# Patient Record
Sex: MALE | Employment: STUDENT | URBAN - METROPOLITAN AREA
[De-identification: names, ages, dates, MRNs, and addresses within clinical notes are randomized per-mention and may not be internally consistent; named-entity substitution may affect disease eponyms.]

---
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
## 2018-05-13 ENCOUNTER — HOSPITAL ENCOUNTER (OUTPATIENT)
Age: 18
Discharge: HOME | End: 2018-05-13
Attending: FAMILY MEDICINE
Payer: COMMERCIAL

## 2018-05-13 VITALS
BODY MASS INDEX: 20 KG/M2 | RESPIRATION RATE: 16 BRPM | OXYGEN SATURATION: 100 % | TEMPERATURE: 99 F | HEART RATE: 100 BPM | DIASTOLIC BLOOD PRESSURE: 78 MMHG | WEIGHT: 145 LBS | SYSTOLIC BLOOD PRESSURE: 129 MMHG

## 2018-05-13 DIAGNOSIS — J01.10 ACUTE NON-RECURRENT FRONTAL SINUSITIS: Primary | ICD-10-CM

## 2018-05-13 PROCEDURE — 99214 OFFICE O/P EST MOD 30 MIN: CPT

## 2018-05-13 PROCEDURE — 99213 OFFICE O/P EST LOW 20 MIN: CPT

## 2018-05-13 RX ORDER — AMOXICILLIN AND CLAVULANATE POTASSIUM 875; 125 MG/1; MG/1
1 TABLET, FILM COATED ORAL 2 TIMES DAILY
Qty: 14 TABLET | Refills: 0 | Status: SHIPPED | OUTPATIENT
Start: 2018-05-13 | End: 2018-05-20

## 2018-05-13 NOTE — ED INITIAL ASSESSMENT (HPI)
Nasal congestin and frontal sinus pressure for 1 week. Has had a moderate amount of yellow mucus. Denies fever and chills. Denies ear pain. Is coughing. Has post nasal drip.

## 2018-05-13 NOTE — ED PROVIDER NOTES
Patient Seen in: [address]    History   Patient presents with:  Headache (neurologic)    Stated Complaint: sinuses headache and low grade fever    HPI    21year old male patient presents with nasal congestion, sinus pres stated in HPI.     Physical Exam   ED Triage Vitals [05/13/18 1211]  BP: 129/78  Pulse: 100  Resp: 16  Temp: 98.5 °F (36.9 °C)  Temp src: Oral  SpO2: 100 %  O2 Device: None (Room air)    Current:/78   Pulse 100   Temp 98.5 °F (36.9 °C) (Oral)   Resp 1

## 2019-05-13 ENCOUNTER — HOSPITAL ENCOUNTER (OUTPATIENT)
Age: 19
Discharge: HOME | End: 2019-05-13
Attending: FAMILY MEDICINE
Payer: COMMERCIAL

## 2019-05-13 ENCOUNTER — APPOINTMENT (OUTPATIENT)
Dept: GENERAL RADIOLOGY | Age: 19
End: 2019-05-13
Attending: FAMILY MEDICINE
Payer: COMMERCIAL

## 2019-05-13 VITALS
RESPIRATION RATE: 18 BRPM | TEMPERATURE: 98 F | HEART RATE: 92 BPM | DIASTOLIC BLOOD PRESSURE: 72 MMHG | OXYGEN SATURATION: 100 % | SYSTOLIC BLOOD PRESSURE: 140 MMHG

## 2019-05-13 DIAGNOSIS — S93.401A SPRAIN OF RIGHT ANKLE, UNSPECIFIED LIGAMENT, INITIAL ENCOUNTER: Primary | ICD-10-CM

## 2019-05-13 PROCEDURE — 73610 X-RAY EXAM OF ANKLE: CPT | Performed by: FAMILY MEDICINE

## 2019-05-13 PROCEDURE — 99213 OFFICE O/P EST LOW 20 MIN: CPT

## 2019-05-13 RX ORDER — NAPROXEN 500 MG/1
500 TABLET ORAL 2 TIMES DAILY WITH MEALS
Qty: 14 TABLET | Refills: 0 | Status: SHIPPED | OUTPATIENT
Start: 2019-05-13 | End: 2019-05-20

## 2019-05-13 NOTE — ED INITIAL ASSESSMENT (HPI)
Pt presents to the IC with c/o right ankle pain s/p rolling it while playing basketball yesterday. Pt has been icing it since, applied an ankle wrap, but has not taken anything for the pain. No numbness/tingling.

## 2019-05-13 NOTE — ED PROVIDER NOTES
Patient presents with:  Lower Extremity Injury (musculoskeletal)    HPI:   Mayo Cleary is a 19year old male present with complain of right ankle injury. Time of injury: Yesterday while playing basketball.   Location: Medial ankle  Nature/type of injury: T Sprain    PLAN:   PROCEDURE:  XR ANKLE (MIN 3 VIEWS), RIGHT (CPT=73610)     COMPARISON: None. INDICATIONS:  Trauma. Twisted right ankle playing basketball yesterday. Pain over the medial aspect of the right ankle.      TECHNIQUE:    3 views were obtai

## 2019-07-01 ENCOUNTER — HOSPITAL ENCOUNTER (OUTPATIENT)
Age: 19
Discharge: HOME | End: 2019-07-01
Attending: EMERGENCY MEDICINE
Payer: COMMERCIAL

## 2019-07-01 VITALS
SYSTOLIC BLOOD PRESSURE: 128 MMHG | OXYGEN SATURATION: 100 % | WEIGHT: 151 LBS | TEMPERATURE: 98 F | HEART RATE: 83 BPM | HEIGHT: 71 IN | DIASTOLIC BLOOD PRESSURE: 77 MMHG | RESPIRATION RATE: 17 BRPM | BODY MASS INDEX: 21.14 KG/M2

## 2019-07-01 DIAGNOSIS — S39.012A STRAIN OF LUMBAR REGION, INITIAL ENCOUNTER: Primary | ICD-10-CM

## 2019-07-01 PROCEDURE — 99214 OFFICE O/P EST MOD 30 MIN: CPT

## 2019-07-01 PROCEDURE — 99213 OFFICE O/P EST LOW 20 MIN: CPT

## 2019-07-01 RX ORDER — TRAMADOL HYDROCHLORIDE 50 MG/1
50 TABLET ORAL EVERY 8 HOURS PRN
Qty: 10 TABLET | Refills: 0 | Status: SHIPPED | OUTPATIENT
Start: 2019-07-01 | End: 2019-07-08

## 2019-07-01 NOTE — ED PROVIDER NOTES
Patient Seen in: [address]    History   Patient presents with:  Back Pain (musculoskeletal)    Stated Complaint: TL-Back Pain    HPI    Patient is a healthy 78-year-old male who has had frequent bouts of low back pain d weakness and numbness. Positive for stated complaint: TL-Back Pain  Other systems are as noted in HPI. Constitutional and vital signs reviewed. All other systems reviewed and negative except as noted above.     Physical Exam     ED Triage Vitals

## 2019-08-21 ENCOUNTER — TELEPHONE (OUTPATIENT)
Dept: PEDIATRICS CLINIC | Facility: CLINIC | Age: 19
End: 2019-08-21

## 2019-08-21 NOTE — TELEPHONE ENCOUNTER
Noted. Patient contacted. Notified that [facility] office is not open today. Patient is okay to  immunization record tomorrow. Message routed accordingly to clinical staff for print out.    Please call patient when ready for

## 2019-08-22 ENCOUNTER — OFFICE VISIT (OUTPATIENT)
Dept: FAMILY MEDICINE CLINIC | Facility: CLINIC | Age: 19
End: 2019-08-22

## 2019-08-22 VITALS
TEMPERATURE: 98 F | OXYGEN SATURATION: 98 % | SYSTOLIC BLOOD PRESSURE: 118 MMHG | HEIGHT: 70 IN | WEIGHT: 150 LBS | BODY MASS INDEX: 21.47 KG/M2 | DIASTOLIC BLOOD PRESSURE: 64 MMHG | HEART RATE: 74 BPM

## 2019-08-22 DIAGNOSIS — Z02.0 SCHOOL PHYSICAL EXAM: Primary | ICD-10-CM

## 2019-08-22 PROCEDURE — 99385 PREV VISIT NEW AGE 18-39: CPT | Performed by: PHYSICIAN ASSISTANT

## 2019-08-22 NOTE — TELEPHONE ENCOUNTER
Pt arrived at [facility] for vaccine record. Vaccine record printed and handed to patient.

## 2019-08-22 NOTE — PROGRESS NOTES
CHIEF COMPLAINT:     Patient presents with:  School Physical      HPI:   Jackie Hay is a 19year old male who presents for physical exam for a study abroad program-- will be going to [employer] in 2020.      Patient has concerns of nothing at this ti °C) (Oral)   Ht 70\"   Wt 150 lb   SpO2 98%   BMI 21.52 kg/m²  Body mass index is 21.52 kg/m².      GENERAL: well developed, well nourished,in no apparent distress  SKIN: no rashes,no suspicious lesions  HEENT: atraumatic, normocephalic,ears and throat are

## 2020-07-14 ENCOUNTER — OFFICE VISIT (OUTPATIENT)
Dept: INTERNAL MEDICINE CLINIC | Facility: CLINIC | Age: 20
End: 2020-07-14

## 2020-07-14 VITALS
DIASTOLIC BLOOD PRESSURE: 69 MMHG | BODY MASS INDEX: 22.9 KG/M2 | HEART RATE: 81 BPM | HEIGHT: 70 IN | WEIGHT: 160 LBS | TEMPERATURE: 98 F | SYSTOLIC BLOOD PRESSURE: 126 MMHG

## 2020-07-14 DIAGNOSIS — Z00.00 PHYSICAL EXAM, ANNUAL: Primary | ICD-10-CM

## 2020-07-14 DIAGNOSIS — M54.50 CHRONIC MIDLINE LOW BACK PAIN WITHOUT SCIATICA: ICD-10-CM

## 2020-07-14 DIAGNOSIS — G89.29 CHRONIC MIDLINE LOW BACK PAIN WITHOUT SCIATICA: ICD-10-CM

## 2020-07-14 PROCEDURE — 99385 PREV VISIT NEW AGE 18-39: CPT | Performed by: INTERNAL MEDICINE

## 2020-07-14 NOTE — PROGRESS NOTES
Fadi Orozco is a 22year old male who presents for a complete physical exam.   HPI:   Pt complains of  Nothing.       Immunization History  Administered            Date(s) Administered    DTAP                  12/01/1998 02/18/1999 04/08/1999 06/20/2016     Lab Results   Component Value Date    ALT 25 07/20/2016    ALT 21 06/20/2016     No results found for: PSA     No current outpatient medications on file.       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     The patient is asked to return for CPX in 1 year

## 2020-07-23 ENCOUNTER — HOSPITAL ENCOUNTER (OUTPATIENT)
Age: 20
Discharge: HOME | End: 2020-07-23
Payer: COMMERCIAL

## 2020-07-23 VITALS
RESPIRATION RATE: 16 BRPM | SYSTOLIC BLOOD PRESSURE: 129 MMHG | WEIGHT: 160 LBS | HEIGHT: 71 IN | TEMPERATURE: 97 F | BODY MASS INDEX: 22.4 KG/M2 | HEART RATE: 73 BPM | DIASTOLIC BLOOD PRESSURE: 81 MMHG | OXYGEN SATURATION: 100 %

## 2020-07-23 DIAGNOSIS — Z20.822 ENCOUNTER FOR LABORATORY TESTING FOR COVID-19 VIRUS: ICD-10-CM

## 2020-07-23 DIAGNOSIS — Z20.822 CLOSE EXPOSURE TO COVID-19 VIRUS: Primary | ICD-10-CM

## 2020-07-23 PROCEDURE — 99202 OFFICE O/P NEW SF 15 MIN: CPT | Performed by: NURSE PRACTITIONER

## 2020-07-23 NOTE — ED PROVIDER NOTES
Patient presents with: Other      HPI:     Wolf Hadley is a 22year old male who presents for evaluation and management of a chief complaint of an exposure to COVID and requesting testing.  He states he was in the same room as someone who tested positive a current or ex partner: Not on file        Emotionally abused: Not on file        Physically abused: Not on file        Forced sexual activity: Not on file    Other Topics      Concerns:         Service: Not Asked        Blood Transfusions: Not Aske for this or any previous visit (from the past 10 hour(s)). MDM:   The COVID test is pending.  We discussed follow up as needed with his PMD.    Diagnosis:    ICD-10-CM    1. Close exposure to COVID-19 virus Z20.828 SARS-COV-2 BY PCR ()     SARS-COV-

## 2020-07-24 LAB — SARS-COV-2 RNA RESP QL NAA+PROBE: NOT DETECTED

## 2020-07-29 ENCOUNTER — OFFICE VISIT (OUTPATIENT)
Dept: PHYSICAL THERAPY | Age: 20
End: 2020-07-29
Attending: PHYSICIAN ASSISTANT
Payer: COMMERCIAL

## 2020-07-29 DIAGNOSIS — M54.50 CHRONIC MIDLINE LOW BACK PAIN WITHOUT SCIATICA: ICD-10-CM

## 2020-07-29 DIAGNOSIS — G89.29 CHRONIC MIDLINE LOW BACK PAIN WITHOUT SCIATICA: ICD-10-CM

## 2020-07-29 PROCEDURE — 97110 THERAPEUTIC EXERCISES: CPT | Performed by: PHYSICAL THERAPIST

## 2020-07-29 PROCEDURE — 97162 PT EVAL MOD COMPLEX 30 MIN: CPT | Performed by: PHYSICAL THERAPIST

## 2020-07-29 NOTE — PROGRESS NOTES
LUMBAR SPINE EVALUATION:   Referring Physician: Dr. Lavon Garcia  Diagnosis: Chronic midline low back pain without sciatica (M54.5,G89.29)   Date of Service: 7/29/2020   Date of Onset: 7/7/2020    PATIENT SUMMARY   Mitzi Villafana is a 22year old y/o male who ache/tightness relieved upon pronelying and back extension improved with repeated extension in lying. He was explained, discussed, and instructed on his HEP and posture correction that is to be done in the next 2 days.   He will be then re-assessed at that morning, and stay still without producing or increasing symptoms in the low back) activities without discomfort. 4. Patient to consistently have good posture to promote her symptomfree condition.      Patient was advised of these findings, precautions, and

## 2020-07-29 NOTE — PATIENT INSTRUCTIONS
Patient was instructed in and issued a HEP for:  Pronelying 2-3 mins to Extension in pronelying x 2-3 mins to Repeated back extension in lying x 10 reps or Repeated extension in standing x 10 reps every 2-3 hours;  Pronelying to stand transfer;  Sitt

## 2020-07-31 ENCOUNTER — OFFICE VISIT (OUTPATIENT)
Dept: PHYSICAL THERAPY | Age: 20
End: 2020-07-31
Attending: PHYSICIAN ASSISTANT
Payer: COMMERCIAL

## 2020-07-31 DIAGNOSIS — G89.29 CHRONIC MIDLINE LOW BACK PAIN WITHOUT SCIATICA: ICD-10-CM

## 2020-07-31 DIAGNOSIS — M54.50 CHRONIC MIDLINE LOW BACK PAIN WITHOUT SCIATICA: ICD-10-CM

## 2020-07-31 PROCEDURE — 97110 THERAPEUTIC EXERCISES: CPT | Performed by: PHYSICAL THERAPIST

## 2020-07-31 NOTE — PROGRESS NOTES
Date: 7/31/2020     Dx: Chronic midline low back pain without sciatica (M54.5,G89.29)           Authorized # of Visits:  8       Referring MD: Adri Delarosa  Next MD visit: none scheduled    Medication Changes since last visit?: No    Subjective: Ilene Burdick report that easier ADLs, functional, work, and recreational activities.    3. Patient to have WNL of lumbar mobility in all directions to facilitate a return to all (sit for any period of time, rise up from sitting, lay down supine (on his back), drive, wake up in the

## 2020-08-05 ENCOUNTER — OFFICE VISIT (OUTPATIENT)
Dept: PHYSICAL THERAPY | Age: 20
End: 2020-08-05
Attending: PHYSICIAN ASSISTANT
Payer: COMMERCIAL

## 2020-08-05 DIAGNOSIS — G89.29 CHRONIC MIDLINE LOW BACK PAIN WITHOUT SCIATICA: ICD-10-CM

## 2020-08-05 DIAGNOSIS — M54.50 CHRONIC MIDLINE LOW BACK PAIN WITHOUT SCIATICA: ICD-10-CM

## 2020-08-05 PROCEDURE — 97110 THERAPEUTIC EXERCISES: CPT | Performed by: PHYSICAL THERAPIST

## 2020-08-05 NOTE — PROGRESS NOTES
Date: 7/31/2020     Dx: Chronic midline low back pain without sciatica (M54.5,G89.29)           Authorized # of Visits:  8       Referring MD: Israel Milan  Next MD visit: none scheduled    Medication Changes since last visit?: No    Subjective: Julia Olivia report that postural stability exercises. He required cueing to correct form and alignment during squat and back exercises. No symptoms produced during his exercises but he was tired after his session. He maintained nil loss extension.      Goals:   Goals     • Ther

## 2020-08-07 ENCOUNTER — OFFICE VISIT (OUTPATIENT)
Dept: PHYSICAL THERAPY | Age: 20
End: 2020-08-07
Attending: PHYSICIAN ASSISTANT
Payer: COMMERCIAL

## 2020-08-07 DIAGNOSIS — M54.50 CHRONIC MIDLINE LOW BACK PAIN WITHOUT SCIATICA: ICD-10-CM

## 2020-08-07 DIAGNOSIS — G89.29 CHRONIC MIDLINE LOW BACK PAIN WITHOUT SCIATICA: ICD-10-CM

## 2020-08-07 PROCEDURE — 97110 THERAPEUTIC EXERCISES: CPT | Performed by: PHYSICAL THERAPIST

## 2020-08-07 NOTE — PROGRESS NOTES
Date: 8/7/2020     Dx: Chronic midline low back pain without sciatica (M54.5,G89.29)           Authorized # of Visits:  8       Referring MD: Anup Heaton  Next MD visit: none scheduled    Medication Changes since last visit?: No    Subjective: Bella Garcia report that tired    LALA over rail x 10 reps Scifit UE only L8 x 5 mins ea fwd/bwkd; postural training    Pronelying x 1 min; NE unload back    REIL sag 8+2 reps; NE    RFIL x 10 reps; NE    RFISitt knees @ 90 degs; P, NW mid back stretch    LALA over rail x 10 reps; Charges:  TherEx x 4       Total Timed Treatment: 53 mins Total Treatment Time: 54 mins

## 2020-08-11 ENCOUNTER — OFFICE VISIT (OUTPATIENT)
Dept: PHYSICAL THERAPY | Age: 20
End: 2020-08-11
Attending: PHYSICIAN ASSISTANT
Payer: COMMERCIAL

## 2020-08-11 DIAGNOSIS — G89.29 CHRONIC MIDLINE LOW BACK PAIN WITHOUT SCIATICA: ICD-10-CM

## 2020-08-11 DIAGNOSIS — M54.50 CHRONIC MIDLINE LOW BACK PAIN WITHOUT SCIATICA: ICD-10-CM

## 2020-08-11 PROCEDURE — 97110 THERAPEUTIC EXERCISES: CPT | Performed by: PHYSICAL THERAPIST

## 2020-08-11 NOTE — PROGRESS NOTES
Date: 8/11/2020     Dx: Chronic midline low back pain without sciatica (M54.5,G89.29)           Authorized # of Visits:  8       Referring MD: Edmundo Oropeza MD visit: none scheduled    Medication Changes since last visit?: No    Subjective: Pablo Osborne report that sag 8+2 reps; NE    RFIL x 10 reps; NE    RFISitt knees @ 90 degs; P, NW mid back stretch    LALA over rail x 10 reps; NE    Prone: plank on forearm and toes 3 reps x 15 secs ea; P, W mid lower back ache    REIL sag 8+2 reps; NE    -  No loss of lumbar mob directional preference exercise, posture correction, postural stability exercises, and patient education. Charges:  TherEx x 3       Total Timed Treatment: 47 mins Total Treatment Time: 48 mins

## 2020-08-12 ENCOUNTER — OFFICE VISIT (OUTPATIENT)
Dept: PHYSICAL THERAPY | Age: 20
End: 2020-08-12
Attending: PHYSICIAN ASSISTANT
Payer: COMMERCIAL

## 2020-08-12 PROCEDURE — 97110 THERAPEUTIC EXERCISES: CPT | Performed by: PHYSICAL THERAPIST

## 2020-08-12 NOTE — PROGRESS NOTES
Date: 8/12/2020     Dx: Chronic midline low back pain without sciatica (M54.5,G89.29)           Authorized # of Visits:  8       Referring MD: Riccardo Aguillon  Next MD visit: none scheduled    Medication Changes since last visit?: No    Subjective: Micky Richmond report that stretch    LALA over rail x 10 reps; NE    Prone: plank on forearm and toes 3 reps x 15 secs ea; P, W mid lower back ache    REIL sag 8+2 reps; NE    -  No loss of lumbar mobility    (B) UE n.row blueTB 10 reps x 10 cts ea;  Inc, W mid back    Sustained ext without producing or increasing symptoms in the low back) activities without discomfort. 4. Patient to consistently have good posture to promote her symptomfree condition.            Plan:   Re-assess next session and continue with directional preference e

## 2020-08-14 ENCOUNTER — OFFICE VISIT (OUTPATIENT)
Dept: PHYSICAL THERAPY | Age: 20
End: 2020-08-14
Attending: PHYSICIAN ASSISTANT
Payer: COMMERCIAL

## 2020-08-14 DIAGNOSIS — M54.50 CHRONIC MIDLINE LOW BACK PAIN WITHOUT SCIATICA: ICD-10-CM

## 2020-08-14 DIAGNOSIS — G89.29 CHRONIC MIDLINE LOW BACK PAIN WITHOUT SCIATICA: ICD-10-CM

## 2020-08-14 PROCEDURE — 97110 THERAPEUTIC EXERCISES: CPT | Performed by: PHYSICAL THERAPIST

## 2020-08-14 NOTE — PROGRESS NOTES
Date: 8/14/2020     Dx: Chronic midline low back pain without sciatica (M54.5,G89.29)           Authorized # of Visits:  8       Referring MD: Su Al  Next MD visit: none scheduled    Medication Changes since last visit?: No    Subjective: Antonieta Thompson report that NE    Prone: plank on forearm and toes 3 reps x 15 secs ea; P, W mid lower back ache    REIL sag 8+2 reps; NE    -  No loss of lumbar mobility    (B) UE n.row blueTB 10 reps x 10 cts ea;  Inc, W mid back    Sustained extension with mat table 3+3+2 mins (hig exercise (REIL or LALA) to maintain his improved condition. He was re-issued a copy of an exercise he wanted to have another copy and a blackTB was issued for home use. He has improved posture in sitting and standing.   He expressed his understanding and

## 2020-08-19 ENCOUNTER — APPOINTMENT (OUTPATIENT)
Dept: PHYSICAL THERAPY | Age: 20
End: 2020-08-19
Attending: PHYSICIAN ASSISTANT
Payer: COMMERCIAL

## 2020-08-21 ENCOUNTER — APPOINTMENT (OUTPATIENT)
Dept: PHYSICAL THERAPY | Age: 20
End: 2020-08-21
Attending: PHYSICIAN ASSISTANT
Payer: COMMERCIAL

## 2021-11-28 ENCOUNTER — IMMUNIZATION (OUTPATIENT)
Dept: LAB | Facility: HOSPITAL | Age: 21
End: 2021-11-28
Attending: EMERGENCY MEDICINE
Payer: COMMERCIAL

## 2021-11-28 DIAGNOSIS — Z23 NEED FOR VACCINATION: Primary | ICD-10-CM

## 2021-11-28 PROCEDURE — 0004A SARSCOV2 VAC 30MCG/0.3ML IM: CPT

## 2021-12-17 ENCOUNTER — NURSE TRIAGE (OUTPATIENT)
Dept: INTERNAL MEDICINE CLINIC | Facility: CLINIC | Age: 21
End: 2021-12-17

## 2021-12-17 NOTE — TELEPHONE ENCOUNTER
Action Requested: Summary for Provider     []  Critical Lab, Recommendations Needed  [] Need Additional Advice  []   FYI    []   Need Orders  [] Need Medications Sent to Pharmacy  []  Other     SUMMARY: Per protocol OV made    Future Appointments   Date

## 2021-12-23 ENCOUNTER — OFFICE VISIT (OUTPATIENT)
Dept: INTERNAL MEDICINE CLINIC | Facility: CLINIC | Age: 21
End: 2021-12-23

## 2021-12-23 ENCOUNTER — HOSPITAL ENCOUNTER (OUTPATIENT)
Dept: GENERAL RADIOLOGY | Age: 21
Discharge: HOME | End: 2021-12-23
Attending: INTERNAL MEDICINE
Payer: COMMERCIAL

## 2021-12-23 VITALS
HEART RATE: 72 BPM | OXYGEN SATURATION: 99 % | SYSTOLIC BLOOD PRESSURE: 118 MMHG | RESPIRATION RATE: 16 BRPM | HEIGHT: 71 IN | BODY MASS INDEX: 22 KG/M2 | TEMPERATURE: 98 F | DIASTOLIC BLOOD PRESSURE: 72 MMHG

## 2021-12-23 DIAGNOSIS — R07.89 LEFT CHEST PRESSURE: Primary | ICD-10-CM

## 2021-12-23 DIAGNOSIS — R07.89 LEFT CHEST PRESSURE: ICD-10-CM

## 2021-12-23 DIAGNOSIS — Z56.6 WORK STRESS: ICD-10-CM

## 2021-12-23 DIAGNOSIS — R19.8 ABDOMINAL FULLNESS IN LEFT UPPER QUADRANT: ICD-10-CM

## 2021-12-23 PROCEDURE — 3078F DIAST BP <80 MM HG: CPT | Performed by: INTERNAL MEDICINE

## 2021-12-23 PROCEDURE — 71046 X-RAY EXAM CHEST 2 VIEWS: CPT | Performed by: INTERNAL MEDICINE

## 2021-12-23 PROCEDURE — 99214 OFFICE O/P EST MOD 30 MIN: CPT | Performed by: INTERNAL MEDICINE

## 2021-12-23 PROCEDURE — 3074F SYST BP LT 130 MM HG: CPT | Performed by: INTERNAL MEDICINE

## 2021-12-23 SDOH — HEALTH STABILITY - MENTAL HEALTH: OTHER PHYSICAL AND MENTAL STRAIN RELATED TO WORK: Z56.6

## 2021-12-23 NOTE — PROGRESS NOTES
Subjective:     Patient ID: Heath Lee is a 19year old male. HPI    Patient comes in today for first time to establish care.   Is complaining some left-sided chest discomfort pressure left upper quadrant abdominal area this is on and off not all the ti Smokeless tobacco: Never Used    Vaping Use      Vaping Use: Never used    Alcohol use: No      Alcohol/week: 0.0 standard drinks    Drug use: No       Objective:   Physical Exam  Nursing note reviewed.    Constitutional:       Appearance: He is well-develo (CPT=71046)  US ABDOMEN LIMITED (CPT=76705)

## 2022-01-17 ENCOUNTER — NURSE TRIAGE (OUTPATIENT)
Dept: INTERNAL MEDICINE CLINIC | Facility: CLINIC | Age: 22
End: 2022-01-17

## 2022-01-17 NOTE — TELEPHONE ENCOUNTER
Action Requested: Summary for Provider     []  Critical Lab, Recommendations Needed  [] Need Additional Advice  []   FYI    []   Need Orders  [] Need Medications Sent to Pharmacy  []  Other     SUMMARY: Per protocol instructed to go to Urgent Care    Reaso

## 2022-04-18 ENCOUNTER — TELEPHONE (OUTPATIENT)
Dept: INTERNAL MEDICINE CLINIC | Facility: CLINIC | Age: 22
End: 2022-04-18

## 2022-04-18 NOTE — TELEPHONE ENCOUNTER
Patient is out of state. He states he has had sore throat over the last couple of days. He denies any difficulty breathing or chest pain. He states that he would like to go to immediate care in [place] where he currently is. He states that he would like a note to state that he needs to be assessed in the immediate care from the doctor so that the visit will be covered by his insurance. He states that note can be sent to him via [employer]. He is aware that Dr. Heather Parks is out of office today.     Letter pended in communications for your approval.

## 2022-06-14 ENCOUNTER — MED REC SCAN ONLY (OUTPATIENT)
Dept: INTERNAL MEDICINE CLINIC | Facility: CLINIC | Age: 22
End: 2022-06-14

## 2022-11-27 ENCOUNTER — IMMUNIZATION (OUTPATIENT)
Dept: LAB | Age: 22
End: 2022-11-27
Attending: EMERGENCY MEDICINE
Payer: COMMERCIAL

## 2022-11-27 DIAGNOSIS — Z23 NEED FOR VACCINATION: Primary | ICD-10-CM

## 2022-11-27 PROCEDURE — 0124A SARSCOV2 VAC BVL 30MCG/0.3ML: CPT

## 2022-11-30 ENCOUNTER — OFFICE VISIT (OUTPATIENT)
Dept: INTERNAL MEDICINE CLINIC | Facility: CLINIC | Age: 22
End: 2022-11-30
Payer: COMMERCIAL

## 2022-11-30 VITALS
HEART RATE: 86 BPM | HEIGHT: 71 IN | SYSTOLIC BLOOD PRESSURE: 100 MMHG | RESPIRATION RATE: 18 BRPM | TEMPERATURE: 98 F | OXYGEN SATURATION: 100 % | WEIGHT: 158 LBS | BODY MASS INDEX: 22.12 KG/M2 | DIASTOLIC BLOOD PRESSURE: 67 MMHG

## 2022-11-30 DIAGNOSIS — R07.89 OTHER CHEST PAIN: ICD-10-CM

## 2022-11-30 DIAGNOSIS — M54.50 CHRONIC BILATERAL LOW BACK PAIN WITHOUT SCIATICA: ICD-10-CM

## 2022-11-30 DIAGNOSIS — Z00.00 ANNUAL PHYSICAL EXAM: Primary | ICD-10-CM

## 2022-11-30 DIAGNOSIS — G89.29 CHRONIC BILATERAL LOW BACK PAIN WITHOUT SCIATICA: ICD-10-CM

## 2022-11-30 DIAGNOSIS — J45.20 MILD INTERMITTENT ASTHMA WITHOUT COMPLICATION: ICD-10-CM

## 2022-11-30 PROCEDURE — 3008F BODY MASS INDEX DOCD: CPT | Performed by: INTERNAL MEDICINE

## 2022-11-30 PROCEDURE — 3074F SYST BP LT 130 MM HG: CPT | Performed by: INTERNAL MEDICINE

## 2022-11-30 PROCEDURE — 90686 IIV4 VACC NO PRSV 0.5 ML IM: CPT | Performed by: INTERNAL MEDICINE

## 2022-11-30 PROCEDURE — 99395 PREV VISIT EST AGE 18-39: CPT | Performed by: INTERNAL MEDICINE

## 2022-11-30 PROCEDURE — 90471 IMMUNIZATION ADMIN: CPT | Performed by: INTERNAL MEDICINE

## 2022-11-30 PROCEDURE — 3078F DIAST BP <80 MM HG: CPT | Performed by: INTERNAL MEDICINE

## 2022-12-01 ENCOUNTER — LAB ENCOUNTER (OUTPATIENT)
Dept: LAB | Age: 22
End: 2022-12-01
Attending: INTERNAL MEDICINE
Payer: COMMERCIAL

## 2022-12-01 DIAGNOSIS — R07.89 OTHER CHEST PAIN: ICD-10-CM

## 2022-12-01 DIAGNOSIS — Z00.00 ANNUAL PHYSICAL EXAM: ICD-10-CM

## 2022-12-01 DIAGNOSIS — J45.20 MILD INTERMITTENT ASTHMA WITHOUT COMPLICATION: ICD-10-CM

## 2022-12-01 LAB
ALBUMIN SERPL-MCNC: 4.2 G/DL (ref 3.4–5)
ALBUMIN/GLOB SERPL: 1.2 {RATIO} (ref 1–2)
ALP LIVER SERPL-CCNC: 70 U/L
ALT SERPL-CCNC: 36 U/L
ANION GAP SERPL CALC-SCNC: 5 MMOL/L (ref 0–18)
AST SERPL-CCNC: 25 U/L (ref 15–37)
BASOPHILS # BLD AUTO: 0.02 X10(3) UL (ref 0–0.2)
BASOPHILS NFR BLD AUTO: 0.4 %
BILIRUB SERPL-MCNC: 0.5 MG/DL (ref 0.1–2)
BILIRUB UR QL: NEGATIVE
BUN BLD-MCNC: 17 MG/DL (ref 7–18)
BUN/CREAT SERPL: 16.8 (ref 10–20)
CALCIUM BLD-MCNC: 9.4 MG/DL (ref 8.5–10.1)
CHLORIDE SERPL-SCNC: 103 MMOL/L (ref 98–112)
CHOLEST SERPL-MCNC: 140 MG/DL (ref ?–200)
CLARITY UR: CLEAR
CO2 SERPL-SCNC: 29 MMOL/L (ref 21–32)
COLOR UR: YELLOW
CREAT BLD-MCNC: 1.01 MG/DL
DEPRECATED RDW RBC AUTO: 37 FL (ref 35.1–46.3)
EOSINOPHIL # BLD AUTO: 0.08 X10(3) UL (ref 0–0.7)
EOSINOPHIL NFR BLD AUTO: 1.7 %
ERYTHROCYTE [DISTWIDTH] IN BLOOD BY AUTOMATED COUNT: 11.2 % (ref 11–15)
FASTING PATIENT LIPID ANSWER: YES
FASTING STATUS PATIENT QL REPORTED: YES
GFR SERPLBLD BASED ON 1.73 SQ M-ARVRAT: 107 ML/MIN/1.73M2 (ref 60–?)
GLOBULIN PLAS-MCNC: 3.6 G/DL (ref 2.8–4.4)
GLUCOSE BLD-MCNC: 98 MG/DL (ref 70–99)
GLUCOSE UR-MCNC: NEGATIVE MG/DL
HCT VFR BLD AUTO: 47.7 %
HDLC SERPL-MCNC: 64 MG/DL (ref 40–59)
HGB BLD-MCNC: 15.9 G/DL
HGB UR QL STRIP.AUTO: NEGATIVE
IMM GRANULOCYTES # BLD AUTO: 0.01 X10(3) UL (ref 0–1)
IMM GRANULOCYTES NFR BLD: 0.2 %
KETONES UR-MCNC: NEGATIVE MG/DL
LDLC SERPL CALC-MCNC: 62 MG/DL (ref ?–100)
LEUKOCYTE ESTERASE UR QL STRIP.AUTO: NEGATIVE
LYMPHOCYTES # BLD AUTO: 1.37 X10(3) UL (ref 1–4)
LYMPHOCYTES NFR BLD AUTO: 29.6 %
MCH RBC QN AUTO: 30 PG (ref 26–34)
MCHC RBC AUTO-ENTMCNC: 33.3 G/DL (ref 31–37)
MCV RBC AUTO: 90 FL
MONOCYTES # BLD AUTO: 0.84 X10(3) UL (ref 0.1–1)
MONOCYTES NFR BLD AUTO: 18.1 %
NEUTROPHILS # BLD AUTO: 2.31 X10 (3) UL (ref 1.5–7.7)
NEUTROPHILS # BLD AUTO: 2.31 X10(3) UL (ref 1.5–7.7)
NEUTROPHILS NFR BLD AUTO: 50 %
NITRITE UR QL STRIP.AUTO: NEGATIVE
NONHDLC SERPL-MCNC: 76 MG/DL (ref ?–130)
OSMOLALITY SERPL CALC.SUM OF ELEC: 286 MOSM/KG (ref 275–295)
PH UR: 5.5 [PH] (ref 5–8)
PLATELET # BLD AUTO: 191 10(3)UL (ref 150–450)
POTASSIUM SERPL-SCNC: 4.1 MMOL/L (ref 3.5–5.1)
PROT SERPL-MCNC: 7.8 G/DL (ref 6.4–8.2)
PROT UR-MCNC: NEGATIVE MG/DL
RBC # BLD AUTO: 5.3 X10(6)UL
SODIUM SERPL-SCNC: 137 MMOL/L (ref 136–145)
SP GR UR STRIP: 1.02 (ref 1–1.03)
TRIGL SERPL-MCNC: 68 MG/DL (ref 30–149)
TSI SER-ACNC: 1.08 MIU/ML (ref 0.36–3.74)
UROBILINOGEN UR STRIP-ACNC: 0.2
VLDLC SERPL CALC-MCNC: 10 MG/DL (ref 0–30)
WBC # BLD AUTO: 4.6 X10(3) UL (ref 4–11)

## 2022-12-01 PROCEDURE — 81003 URINALYSIS AUTO W/O SCOPE: CPT

## 2022-12-01 PROCEDURE — 80061 LIPID PANEL: CPT

## 2022-12-01 PROCEDURE — 85025 COMPLETE CBC W/AUTO DIFF WBC: CPT

## 2022-12-01 PROCEDURE — 80053 COMPREHEN METABOLIC PANEL: CPT

## 2022-12-01 PROCEDURE — 36415 COLL VENOUS BLD VENIPUNCTURE: CPT

## 2022-12-01 PROCEDURE — 84443 ASSAY THYROID STIM HORMONE: CPT

## 2022-12-25 ENCOUNTER — HOSPITAL ENCOUNTER (OUTPATIENT)
Age: 22
Discharge: HOME | End: 2022-12-25
Payer: COMMERCIAL

## 2022-12-25 VITALS
OXYGEN SATURATION: 100 % | DIASTOLIC BLOOD PRESSURE: 83 MMHG | HEART RATE: 78 BPM | TEMPERATURE: 98 F | RESPIRATION RATE: 20 BRPM | SYSTOLIC BLOOD PRESSURE: 130 MMHG

## 2022-12-25 DIAGNOSIS — J02.9 VIRAL PHARYNGITIS: Primary | ICD-10-CM

## 2022-12-25 LAB — S PYO AG THROAT QL: NEGATIVE

## 2023-05-05 ENCOUNTER — NURSE TRIAGE (OUTPATIENT)
Dept: INTERNAL MEDICINE CLINIC | Facility: CLINIC | Age: 23
End: 2023-05-05

## 2023-12-21 ENCOUNTER — TELEPHONE (OUTPATIENT)
Dept: INTERNAL MEDICINE CLINIC | Facility: CLINIC | Age: 23
End: 2023-12-21

## 2023-12-21 DIAGNOSIS — J10.1 INFLUENZA DUE TO INFLUENZA A VIRUS: Primary | ICD-10-CM

## 2023-12-21 RX ORDER — OSELTAMIVIR PHOSPHATE 75 MG/1
75 CAPSULE ORAL 2 TIMES DAILY
Qty: 10 CAPSULE | Refills: 0 | Status: SHIPPED | OUTPATIENT
Start: 2023-12-21 | End: 2023-12-26

## 2023-12-21 NOTE — TELEPHONE ENCOUNTER
Called patient (name and  verified) and informed the Tamiflu Rx was sent to the [place]. Patient verbalized understanding and agrees with plan.

## 2023-12-21 NOTE — TELEPHONE ENCOUNTER
Patient calling (identified name and ) states he was out visiting friends in [place] and had the flu. Was seen in [place] by provider and received Tamiflu. Today would be day 3 of Tamiflu but had lost the rest of the medication during traveling. Asking if he can received a prescription to complete the 5 course of medication. Only took 2 days worth of medication so far. States symptoms are better with lingering fatigue and cough. Symptoms were initially very bad with fever, body aches, chills, cough and severe fatigue. Please advise.

## 2023-12-22 ENCOUNTER — HOSPITAL ENCOUNTER (OUTPATIENT)
Age: 23
Discharge: LEFT BEFORE DISCHARGE | End: 2023-12-22
Payer: COMMERCIAL

## (undated) NOTE — MR AVS SNAPSHOT
[address] [facility]  [phone]               Thank you for choosing us for your health care visit with Dinh Holloway. DO Kav[place].   We are glad to serve you and happy to provide you with this sum your doctor or other care provider to review them with you.             Today's Orders     Derm Referral - Monico Solis)    Complete by:  As directed    Assoc Dx:  Acne vulgaris [L70.0]                 Referral Details     Referred By    Referred To your Zip Code and Date of Birth to complete the sign-up process. If you do not sign up before the expiration date, you must request a new code.     Your unique 3dplusme Access Code: [email or web address]  Expires: 2/21/2017  9:26 AM    If you have questions, you can c

## (undated) NOTE — Clinical Note
[facility] of [facility] Examination       Student's Name  Uyen Landa Birth Date Title                           Date    (If adding dates to the above immunization history section, put your initials by date(s) and sign here.)   ALTERNATIVE PROOF OF IMMUNITY   1 Diagnosis of asthma? Child wakes during the night coughing   Yes       No    Yes       No    Loss of function of one of paired organs? (eye/ear/kidney/testicle)   Yes       No      Birth Defects? Developmental delay?    Yes       No    Yes       No  Hospi Family History   no                 Ethnic Minority      no                       Signs of Insulin Resistance (hypertension, dyslipidemia, polycystic ovarian syndrome, acanthosis nigricans)        No                    At Risk    no   Lead Risk Thalia Oliva Controller medication (e.g. inhaled corticosteroid):   No Other   NEEDS/MODIFICATIONS required in the school setting  None DIETARY Needs/Restrictions     None   SPECIAL INSTRUCTIONS/DEVICES e.g. safety glasses, glass eye, chest protector for arrhyt

## (undated) NOTE — LETTER
VACCINE ADMINISTRATION RECORD  PARENT / GUARDIAN APPROVAL  Date: 2017  Vaccine administered to: Liset Mcgrath     : 2000    MRN: [id number]    A copy of the appropriate Centers for Disease Control and Prevention Vaccine Information statement has

## (undated) NOTE — ED AVS SNAPSHOT
[facility] AND [facility] in [place] [address]    Phone:  [phone]    Fax:  [phone]           Mr. Huddleston Lou   MRN: [id number]    Department:  [facility]   Date of Visit:  1 may not be covered by your plan. It is possible that the physician may not participate in your health insurance plan. This may result in a lower benefit level being available to you or other limited reimbursement.   The physician may seek payment directly If you have been prescribed any medication(s), please fill your prescription right away and begin taking the medication(s) as directed.   If you believe that any of the medications or instructions on this list is different from what your Primary Care doctor Patient [address] to help you get signed up for insurance coverage. Patient [address] is a Federal Navigator program that can help with your Affordable Care Act coverage, as well as all types of Medicaid plans.   To get signed up and covere

## (undated) NOTE — ED AVS SNAPSHOT
[facility] in [place] [address]    Phone:  [phone]    Fax:  [phone]           Mr. Kallie Dowell   MRN: [id number]    Department:  [facility] in [address] [place] [address]   Date of Visit:  2 breath, difficulty breathing, lethargy, decreased fluid intake or any other new or worsening symptoms please return to clinic or go to the ER immediately.   If you are unable to get a follow-up appointment as instructed with your primary care doctor, please [facility]. Follow-up care is at the discretion of that Physician.   If you need additional assistance selecting a physician, you may call the [place] [address] Physician Referral and Class Registration line at [phone] or f Additional Information       We are concerned for your overall well being:    - If you are a smoker or have smoked in the last 12 months, we encourage you to explore options for quitting.     - If you have concerns related to behavioral health issues or th

## (undated) NOTE — MR AVS SNAPSHOT
[facility] [place] [address] [address] [id number] [phone]               Thank you for choosing us for your health care visit with Diego Gomez MD.  We are glad to serve you and happy to provide you with this summary of Expires: 2/21/2017  9:26 AM    If you have questions, you can call [phone] to talk to our [facility] Staff. Remember, [employer] is NOT to be used for urgent needs. For medical emergencies, dial 911.            Visit [facility]

## (undated) NOTE — LETTER
8/23/2017              Danny Whitney ([birth year])         [address]        [facility] [address]         Immunization History   Administered Date(s) Administered   • DTAP 12/01/1998, 02/18/1999, 04/08/1999, 04/12/2000, 08/22/2003   • HEP A 12/06/2

## (undated) NOTE — LETTER
Date & Time: 5/13/2019, 10:27 AM  Patient: Ashwini Puri  Encounter Provider(s):    Angelic Tavarez MD       To Whom It May Concern:    Forrest Mayes was seen and treated in our department on 5/13/2019.  He is recommended physical activity as tolerated for

## (undated) NOTE — LETTER
Patient Name: Dayana Damon  YOB: 2000          MRN :  [id number]  Date: 8/14/2020     Dx: Chronic midline low back pain without sciatica (M54.5,G89.29)           Authorized # of Visits:  8       Referring MD: Lori Oropeza MD visit: none schedu down supine (on his back), drive, wake up in the morning, and stay still without producing or increasing symptoms in the low back) activities without discomfort. 4. Patient to consistently have good posture to promote her symptomfree condition.